# Patient Record
Sex: FEMALE | Race: WHITE | NOT HISPANIC OR LATINO
[De-identification: names, ages, dates, MRNs, and addresses within clinical notes are randomized per-mention and may not be internally consistent; named-entity substitution may affect disease eponyms.]

---

## 2021-12-07 ENCOUNTER — APPOINTMENT (OUTPATIENT)
Dept: UROGYNECOLOGY | Facility: CLINIC | Age: 31
End: 2021-12-07

## 2021-12-29 ENCOUNTER — APPOINTMENT (OUTPATIENT)
Dept: ENDOCRINOLOGY | Facility: CLINIC | Age: 31
End: 2021-12-29
Payer: COMMERCIAL

## 2021-12-29 DIAGNOSIS — M62.89 OTHER SPECIFIED DISORDERS OF MUSCLE: ICD-10-CM

## 2021-12-29 DIAGNOSIS — Z82.79 FAMILY HISTORY OF OTHER CONGENITAL MALFORMATIONS, DEFORMATIONS AND CHROMOSOMAL ABNORMALITIES: ICD-10-CM

## 2021-12-29 DIAGNOSIS — F41.9 ANXIETY DISORDER, UNSPECIFIED: ICD-10-CM

## 2021-12-29 DIAGNOSIS — Z82.49 FAMILY HISTORY OF ISCHEMIC HEART DISEASE AND OTHER DISEASES OF THE CIRCULATORY SYSTEM: ICD-10-CM

## 2021-12-29 DIAGNOSIS — G43.909 MIGRAINE, UNSPECIFIED, NOT INTRACTABLE, W/OUT STATUS MIGRAINOSUS: ICD-10-CM

## 2021-12-29 DIAGNOSIS — E78.5 HYPERLIPIDEMIA, UNSPECIFIED: ICD-10-CM

## 2021-12-29 DIAGNOSIS — K62.3 RECTAL PROLAPSE: ICD-10-CM

## 2021-12-29 DIAGNOSIS — Z83.438 FAMILY HISTORY OF OTHER DISORDER OF LIPOPROTEIN METABOLISM AND OTHER LIPIDEMIA: ICD-10-CM

## 2021-12-29 PROCEDURE — 99204 OFFICE O/P NEW MOD 45 MIN: CPT | Mod: 95

## 2021-12-29 RX ORDER — CHROMIUM 200 MCG
TABLET ORAL
Refills: 0 | Status: ACTIVE | COMMUNITY

## 2021-12-29 RX ORDER — EZETIMIBE 10 MG/1
10 TABLET ORAL
Refills: 0 | Status: ACTIVE | COMMUNITY

## 2021-12-29 RX ORDER — ETONOGESTREL AND ETHINYL ESTRADIOL .12; .015 MG/D; MG/D
0.12-0.015 INSERT, EXTENDED RELEASE VAGINAL
Refills: 0 | Status: ACTIVE | COMMUNITY

## 2021-12-29 RX ORDER — ROSUVASTATIN CALCIUM 5 MG/1
TABLET, FILM COATED ORAL
Refills: 0 | Status: ACTIVE | COMMUNITY

## 2021-12-29 RX ORDER — FLUOXETINE HYDROCHLORIDE 40 MG/1
CAPSULE ORAL
Refills: 0 | Status: ACTIVE | COMMUNITY

## 2021-12-29 RX ORDER — B-COMPLEX WITH VITAMIN C
TABLET ORAL
Refills: 0 | Status: ACTIVE | COMMUNITY

## 2021-12-29 RX ORDER — PNV NO.95/FERROUS FUM/FOLIC AC 28MG-0.8MG
TABLET ORAL
Refills: 0 | Status: ACTIVE | COMMUNITY

## 2021-12-29 RX ORDER — LORATADINE 10 MG
TABLET,DISINTEGRATING ORAL
Refills: 0 | Status: ACTIVE | COMMUNITY

## 2021-12-29 NOTE — HISTORY OF PRESENT ILLNESS
[Home] : at home, [unfilled] , at the time of the visit. [Medical Office: ([facility])___] : at the medical office located in  [Verbal consent obtained from patient] : the patient, [unfilled] [FreeTextEntry1] : Ms. Bashir is a 31 year-old woman presenting for evaluation of low alkaline phosphatase activity. 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 She has had values as low as 26 U/L in adulthood per her report. Alkaline phosphatase levels were 33 U/L and 38 U/L (normal: ) in May and October 2020, respectively. \par She has a history of a dental implant because her second incisor failed to grow. No known premature loss of deciduous teeth. \par She has a history of a right Chacko fifth metatarsal fracture around age 18 years in a low impact injury. \par She had a negative evaluation for celiac disease in the past per her report. \par She has a history of rectal prolapse and underwent evaluation for Inés Danlos syndrome.\par She overall feels well. No muscle weakness, arthralgias, myalgias, decreased endurance, brain fog. Review of systems otherwise negative in detail. \par \par She was recently diagnosed with a mild COVID-19 infection in the setting of sore throat and rhinorrhea.

## 2021-12-29 NOTE — ASSESSMENT
[FreeTextEntry1] : Low alkaline phosphatase activity. 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 She has had values as low as 26 U/L in adulthood per her report. Alkaline phosphatase levels were 33 U/L and 38 U/L (normal: ) in May and October 2020, respectively. She has a history of a dental implant because her second incisor failed to grow. She has a history of a right Chacko fifth metatarsal fracture around age 18 years in a low impact injury. She had a negative evaluation for celiac disease in the past per her report. She desires genetic testing since she is interested if an ALPL mutation is present. We will send genetic testing once she has recovered from her acute infection.

## 2022-01-14 ENCOUNTER — APPOINTMENT (OUTPATIENT)
Dept: ENDOCRINOLOGY | Facility: CLINIC | Age: 32
End: 2022-01-14
Payer: COMMERCIAL

## 2022-01-14 VITALS
DIASTOLIC BLOOD PRESSURE: 72 MMHG | BODY MASS INDEX: 25.52 KG/M2 | HEART RATE: 78 BPM | SYSTOLIC BLOOD PRESSURE: 122 MMHG | HEIGHT: 60 IN | WEIGHT: 130 LBS

## 2022-01-14 DIAGNOSIS — R74.8 ABNORMAL LEVELS OF OTHER SERUM ENZYMES: ICD-10-CM

## 2022-01-14 PROCEDURE — 36415 COLL VENOUS BLD VENIPUNCTURE: CPT

## 2022-01-14 PROCEDURE — 99212 OFFICE O/P EST SF 10 MIN: CPT | Mod: 25

## 2022-02-01 NOTE — PHYSICAL EXAM
[Alert] : alert [Healthy Appearance] : healthy appearance [No Acute Distress] : no acute distress [Normal Sclera/Conjunctiva] : normal sclera/conjunctiva [Normal Hearing] : hearing was normal [No Respiratory Distress] : no respiratory distress [Normal Insight/Judgement] : insight and judgment were intact [No Stigmata of Cushings Syndrome] : no stigmata of Cushings Syndrome [Normal Gait] : normal gait [Kyphosis] : no kyphosis present [Acanthosis Nigricans] : no acanthosis nigricans [de-identified] : no moon facies, no supraclavicular fat pads

## 2022-02-01 NOTE — ADDENDUM
[FreeTextEntry1] : Recent laboratory results as below; discussed with Ms. Bashir. Alkaline phosphatase low as per previous; calciotropic panel otherwise within range. 2/01/22\par \par Laboratories (January 25, 2022) reviewed and significant for: \par Calcium 9.9 mg/dL (albumin 4.5 g/dL)\par PTH 30 pg/mL\par BUN/creatinine 13/0.97 mg/dL (eGFR 78 mL/min)\par Alkaline phosphatase 37 U/L (normal: )\par Phosphorus 4.0 mg/dL\par Magnesium 2.2 mg/dL\par 25-hydroxyvitamin D 40.2 ng/mL

## 2022-02-01 NOTE — ASSESSMENT
[FreeTextEntry1] : Low alkaline phosphatase activity. 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 She has had values as low as 26 U/L in adulthood per her report. Alkaline phosphatase levels were 33 U/L and 38 U/L (normal: ) in May and October 2020, respectively. She has a history of a dental implant because her second incisor failed to grow. She has a history of a right Chacko fifth metatarsal fracture around age 18 years in a low impact injury. She had a negative evaluation for celiac disease in the past per her report. She desires genetic testing since she is interested if an ALPL mutation is present. Blood drawn today for skeletal dysplasia panel.

## 2022-02-01 NOTE — HISTORY OF PRESENT ILLNESS
[FreeTextEntry1] : Ms. Bashir is a 31 year-old woman presenting for follow-up of low alkaline phosphatase activity. I saw her for an initial visit in December 2019. 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 She has had values as low as 26 U/L in adulthood per her report. Alkaline phosphatase levels were 33 U/L and 38 U/L (normal: ) in May and October 2020, respectively. \par She has a history of a dental implant because her second incisor failed to grow. No known premature loss of deciduous teeth. \par She has a history of a right Chacko fifth metatarsal fracture around age 18 years in a low impact injury. \par She had a negative evaluation for celiac disease in the past per her report. \par She has a history of rectal prolapse and underwent evaluation for Inés Danlos syndrome.\par \par Interim History \par She has recovered from her recent COVID-19 infection.\par She presents today for genetic testing. Risks and benefits discussed. Blood sample drawn.\par She overall feels well. No muscle weakness, arthralgias, myalgias, decreased endurance, brain fog. Review of systems otherwise negative in detail. \par Medical and surgical history, medications, allergies, social and family history reviewed and updated as needed.

## 2023-05-23 ENCOUNTER — NON-APPOINTMENT (OUTPATIENT)
Age: 33
End: 2023-05-23

## 2023-05-23 ENCOUNTER — TRANSCRIPTION ENCOUNTER (OUTPATIENT)
Age: 33
End: 2023-05-23

## 2023-05-23 ENCOUNTER — APPOINTMENT (OUTPATIENT)
Dept: OBGYN | Facility: CLINIC | Age: 33
End: 2023-05-23
Payer: COMMERCIAL

## 2023-05-23 VITALS
HEART RATE: 88 BPM | OXYGEN SATURATION: 99 % | DIASTOLIC BLOOD PRESSURE: 70 MMHG | BODY MASS INDEX: 27.09 KG/M2 | SYSTOLIC BLOOD PRESSURE: 115 MMHG | HEIGHT: 60 IN | WEIGHT: 138 LBS

## 2023-05-23 DIAGNOSIS — N80.9 ENDOMETRIOSIS, UNSPECIFIED: ICD-10-CM

## 2023-05-23 DIAGNOSIS — N83.299 OTHER OVARIAN CYST, UNSPECIFIED SIDE: ICD-10-CM

## 2023-05-23 PROCEDURE — 99205 OFFICE O/P NEW HI 60 MIN: CPT

## 2023-05-23 NOTE — COUNSELING
[Nutrition/ Exercise/ Weight Management] : nutrition, exercise, weight management [Preconception Care/ Fertility options] : preconception care, fertility options [Pregnancy Options] : pregnancy options [Lab Results] : lab results [Medication Management] : medication management [Pre/Post Op Instructions] : pre/post op instructions [Other ___] : [unfilled]

## 2023-06-22 NOTE — DISCUSSION/SUMMARY
[FreeTextEntry1] : I sat down with the patient to discuss the imaging findings & her symptoms which warrant intervention. I explained that the pelvic pain is likely due to presumed diagnosis of endometriosis. We discussed medication for management of endometriosis hormones such as changing oral contraceptive pill, NuvaRing, LNG IUD, Depo Provera, Orillisa and Depo Lupron and pain management with NSAIDs, and surgical intervention with removal of implants or pelvic biopsies if not visible pathology seen.  The options for surgical approach including open, vaginal, and laparoscopic with or without robotic assistance. 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 but not limited to, conversion to laparotomy, bleeding, infection, injury to surrounding organs was discussed at length. Chance of occult injury and need for future surgery. FRANCISCO GONZALEZ understands that there is increased risk with endometriosis surgery due to nature of disease to cause adhesions and inflammation. FRANCISCO GONZALEZ expressed an understanding of the treatment rationale and her questions were answered to her apparent satisfaction.  FRANCISCO GONZALEZ was given written information about endometriosis and surgery, diagrams of pelvic anatomy.  All questions and concerns addressed, she expressed understanding. For now she would like repeat imaging in 6 months and short interval follow-up